# Patient Record
Sex: FEMALE | Race: BLACK OR AFRICAN AMERICAN | NOT HISPANIC OR LATINO | Employment: FULL TIME | URBAN - METROPOLITAN AREA
[De-identification: names, ages, dates, MRNs, and addresses within clinical notes are randomized per-mention and may not be internally consistent; named-entity substitution may affect disease eponyms.]

---

## 2024-10-12 ENCOUNTER — HOSPITAL ENCOUNTER (EMERGENCY)
Facility: HOSPITAL | Age: 39
Discharge: HOME | End: 2024-10-12
Attending: STUDENT IN AN ORGANIZED HEALTH CARE EDUCATION/TRAINING PROGRAM
Payer: COMMERCIAL

## 2024-10-12 VITALS
DIASTOLIC BLOOD PRESSURE: 81 MMHG | BODY MASS INDEX: 21.08 KG/M2 | WEIGHT: 131.19 LBS | TEMPERATURE: 99 F | HEART RATE: 82 BPM | RESPIRATION RATE: 18 BRPM | SYSTOLIC BLOOD PRESSURE: 134 MMHG | OXYGEN SATURATION: 100 % | HEIGHT: 66 IN

## 2024-10-12 DIAGNOSIS — B34.9 VIRAL SYNDROME: Primary | ICD-10-CM

## 2024-10-12 LAB
B-HCG UR QL: NEGATIVE
GROUP A STREP, MOLECULAR: NEGATIVE
INFLUENZA A, MOLECULAR: NEGATIVE
INFLUENZA B, MOLECULAR: NEGATIVE
SARS-COV-2 RDRP RESP QL NAA+PROBE: NEGATIVE
SPECIMEN SOURCE: NORMAL

## 2024-10-12 PROCEDURE — 25000003 PHARM REV CODE 250: Performed by: STUDENT IN AN ORGANIZED HEALTH CARE EDUCATION/TRAINING PROGRAM

## 2024-10-12 PROCEDURE — 87651 STREP A DNA AMP PROBE: CPT | Performed by: PHYSICIAN ASSISTANT

## 2024-10-12 PROCEDURE — U0002 COVID-19 LAB TEST NON-CDC: HCPCS | Performed by: STUDENT IN AN ORGANIZED HEALTH CARE EDUCATION/TRAINING PROGRAM

## 2024-10-12 PROCEDURE — 81025 URINE PREGNANCY TEST: CPT | Performed by: PHYSICIAN ASSISTANT

## 2024-10-12 PROCEDURE — 87502 INFLUENZA DNA AMP PROBE: CPT | Performed by: STUDENT IN AN ORGANIZED HEALTH CARE EDUCATION/TRAINING PROGRAM

## 2024-10-12 PROCEDURE — 25000003 PHARM REV CODE 250: Performed by: PHYSICIAN ASSISTANT

## 2024-10-12 PROCEDURE — 99283 EMERGENCY DEPT VISIT LOW MDM: CPT | Mod: 25

## 2024-10-12 RX ORDER — ACETAMINOPHEN 500 MG
1000 TABLET ORAL
Status: COMPLETED | OUTPATIENT
Start: 2024-10-12 | End: 2024-10-12

## 2024-10-12 RX ORDER — BENZONATATE 100 MG/1
100 CAPSULE ORAL 3 TIMES DAILY PRN
Qty: 15 CAPSULE | Refills: 0 | Status: SHIPPED | OUTPATIENT
Start: 2024-10-12 | End: 2024-10-17

## 2024-10-12 RX ORDER — IBUPROFEN 600 MG/1
600 TABLET ORAL
Status: COMPLETED | OUTPATIENT
Start: 2024-10-12 | End: 2024-10-12

## 2024-10-12 RX ADMIN — ACETAMINOPHEN 1000 MG: 500 TABLET ORAL at 03:10

## 2024-10-12 RX ADMIN — IBUPROFEN 600 MG: 600 TABLET ORAL at 03:10

## 2024-10-12 NOTE — DISCHARGE INSTRUCTIONS
Please return to the Emergency Department immediately for any new or concerning symptoms or if they get worse.   If you were prescribed antibiotics, please take them to completion.   If you were prescribed a narcotic medication, do not drive or operate heavy equipment or machinery, while taking these medications.  Please follow up with your primary care doctor or specialist in one week.  If you smoke, please stop smoking.    If you do not have a doctor, you can go to the website [address] [place], [place] [email or web address] look up where there is a primary care clinic close to you.    Our goal in the emergency department is to always give you outstanding care and exceptional service. You may receive a survey by mail or e-mail in the next week regarding your experience in our ED. We would greatly appreciate your completing and returning the survey. Your feedback provides us with a way to recognize our staff who give very good care and it helps us learn how to improve when your experience was below our aspiration of excellence.

## 2024-10-12 NOTE — ED PROVIDER NOTES
Encounter Date: 10/12/2024       History     Chief Complaint   Patient presents with    General Illness     Patient is a 38 year old female with no significant PMHx. She presents to the ED for general illness. She reports having generalized body aches since yesterday. Reports associated headache and cough. She denies fever,chills, nausea, vomiting, sob, chest pain, abd pain, dysuria, diarrhea, or constipation. She is a non smoker and denies alcohol use.    The history is provided by the patient and medical records. No  was used.     Review of patient's allergies indicates:  No Known Allergies  History reviewed. No pertinent past medical history.  History reviewed. No pertinent surgical history.  No family history on file.  Social History     Tobacco Use    Smoking status: Unknown     Review of Systems   Constitutional:  Negative for fever.   HENT:  Negative for sore throat.    Respiratory:  Positive for cough. Negative for shortness of breath.    Cardiovascular:  Negative for chest pain.   Gastrointestinal:  Negative for abdominal pain, nausea and vomiting.   Genitourinary:  Negative for dysuria.   Musculoskeletal:  Positive for myalgias. Negative for back pain.   Skin:  Negative for rash.   Neurological:  Positive for headaches. Negative for weakness.   Hematological:  Does not bruise/bleed easily.       Physical Exam     Initial Vitals [10/12/24 1457]   BP Pulse Resp Temp SpO2   (!) 151/82 (!) 113 20 (!) 100.8 °F (38.2 °C) 99 %      MAP       --         Physical Exam    Vitals reviewed.  Constitutional: She appears well-developed and well-nourished. No distress.   HENT:   Head: Normocephalic. Mouth/Throat: Uvula is midline and oropharynx is clear and moist. No trismus in the jaw. No uvula swelling. No oropharyngeal exudate, posterior oropharyngeal edema or posterior oropharyngeal erythema.   Patient tolerating oral secretions without difficulty.    Eyes: Conjunctivae are normal.   Neck:    Normal range of motion.  Cardiovascular:  Normal rate and regular rhythm.           No murmur heard.  Pulmonary/Chest: Breath sounds normal. No respiratory distress. She has no wheezes. She has no rales.   Musculoskeletal:         General: Normal range of motion.      Cervical back: Normal range of motion.     Neurological: She is alert and oriented to person, place, and time. She has normal strength. Gait normal.   Skin: Skin is warm and dry.         ED Course   Procedures  Labs Reviewed   INFLUENZA A & B BY MOLECULAR       Result Value    Influenza A, Molecular Negative      Influenza B, Molecular Negative      Flu A & B Source Nasal swab     GROUP A STREP, MOLECULAR    Group A Strep, Molecular Negative     THROAT SCREEN, RAPID STREP   SARS-COV-2 RNA AMPLIFICATION, QUAL    SARS-CoV-2 RNA, Amplification, Qual Negative     PREGNANCY TEST, URINE RAPID    Preg Test, Ur Negative      Narrative:     Specimen Source->Urine          Imaging Results              X-Ray Chest PA And Lateral (Final result)  Result time 10/12/24 16:13:06      Final result by Xenia Harris MD (10/12/24 16:13:06)                   Impression:      No acute abnormality.      Electronically signed by: Xenia Harris  Date:    10/12/2024  Time:    16:13               Narrative:    EXAMINATION:  XR CHEST PA AND LATERAL    CLINICAL HISTORY:  Cough, unspecified    TECHNIQUE:  PA and lateral views of the chest were performed.    COMPARISON:  None    FINDINGS:  The lungs are clear, with normal appearance of pulmonary vasculature and no pleural effusion or pneumothorax.    The cardiac silhouette is normal in size. The hilar and mediastinal contours are unremarkable.    Bones are intact.                                       Medications   acetaminophen tablet 1,000 mg (1,000 mg Oral Given 10/12/24 1501)   ibuprofen tablet 600 mg (600 mg Oral Given 10/12/24 1557)     Medical Decision Making  Problems Addressed:  Viral syndrome: acute illness  or injury    Amount and/or Complexity of Data Reviewed  Labs: ordered.  Radiology: ordered.    Risk  OTC drugs.  Prescription drug management.         APC / Resident Notes:   Patient is a 38 year old female who presents to the ED for emergent evaluation of general illness.     Will order labs. Will order antipyretic for fever of 100.8. UPT negative. Will continue to monitor.     Differential diagnoses include, but are not limited to: COVID, FLU, pharyngitis, or bronchitis    Patient is COVID, FLU, and STREP negative, hemodynamically stable, without increased work of breathing. CXR found to have no acute abn.     I strongly suspect that this patient's clinical picture is consistent with a viral infection.  At this point in time, there are no signs of a serious bacterial infection.  For this reason, it would not be safe or appropriate for this patient to be prescribed antibiotics as the potential harms (rash, diarrhea, allergic reaction) would greatly outweigh any potential benefits.    Patient reassessed, reports symptoms improved with ED management. I have discussed emergency department findings, and plan with the patient. Will discharge home with tessalon perles for symptomatic relief. Will discharge home with F/U with PCP in approximately one week. Additional verbal discharge instructions were given and discussed with the patient. Patient verbalizes understanding of plan and agrees. Return precautions given.                     Clinical Impression:  Final diagnoses:  [B34.9] Viral syndrome (Primary)          ED Disposition Condition    Discharge Stable          ED Prescriptions       Medication Sig Dispense Start Date End Date Auth. Provider    benzonatate (TESSALON) 100 MG capsule Take 1 capsule (100 mg total) by mouth 3 (three) times daily as needed for Cough. 15 capsule 10/12/2024 10/17/2024 Clarisa Mazariegos PA-C          Follow-up Information       Follow up With Specialties Details Why Contact Info     [facility]  Schedule an appointment as soon as possible for a visit in 1 week  [address]  [facility] [address]             Clarisa Mazariegos PA-C  10/12/24 [id number]

## 2024-10-12 NOTE — ED TRIAGE NOTES
38 y.o. female presents to ER Room/bed info not found   Chief Complaint   Patient presents with    General Illness   .   C/o headache, dry cough, back aches since yesterday

## 2024-11-22 ENCOUNTER — PATIENT MESSAGE (OUTPATIENT)
Dept: RESEARCH | Facility: HOSPITAL | Age: 39
End: 2024-11-22
Payer: COMMERCIAL

## 2025-01-09 ENCOUNTER — HOSPITAL ENCOUNTER (EMERGENCY)
Facility: HOSPITAL | Age: 40
Discharge: HOME | End: 2025-01-10
Attending: SURGERY
Payer: COMMERCIAL

## 2025-01-09 DIAGNOSIS — R51.9 NONINTRACTABLE HEADACHE, UNSPECIFIED CHRONICITY PATTERN, UNSPECIFIED HEADACHE TYPE: Primary | ICD-10-CM

## 2025-01-09 DIAGNOSIS — I10 HTN (HYPERTENSION): ICD-10-CM

## 2025-01-09 LAB
ALBUMIN SERPL BCP-MCNC: 4.1 G/DL (ref 3.5–5.2)
ALP SERPL-CCNC: 146 U/L (ref 40–150)
ALT SERPL W/O P-5'-P-CCNC: 12 U/L (ref 10–44)
ANION GAP SERPL CALC-SCNC: 9 MMOL/L (ref 8–16)
AST SERPL-CCNC: 19 U/L (ref 10–40)
B-HCG UR QL: NEGATIVE
BASOPHILS # BLD AUTO: 0.02 K/UL (ref 0–0.2)
BASOPHILS NFR BLD: 0.4 % (ref 0–1.9)
BILIRUB SERPL-MCNC: 0.3 MG/DL (ref 0.1–1)
BILIRUB UR QL STRIP: NEGATIVE
BNP SERPL-MCNC: <10 PG/ML (ref 0–99)
BUN SERPL-MCNC: 8 MG/DL (ref 6–20)
CALCIUM SERPL-MCNC: 9.2 MG/DL (ref 8.7–10.5)
CHLORIDE SERPL-SCNC: 105 MMOL/L (ref 95–110)
CLARITY UR: CLEAR
CO2 SERPL-SCNC: 24 MMOL/L (ref 23–29)
COLOR UR: YELLOW
CREAT SERPL-MCNC: 0.7 MG/DL (ref 0.5–1.4)
DIFFERENTIAL METHOD BLD: ABNORMAL
EOSINOPHIL # BLD AUTO: 0.1 K/UL (ref 0–0.5)
EOSINOPHIL NFR BLD: 2 % (ref 0–8)
ERYTHROCYTE [DISTWIDTH] IN BLOOD BY AUTOMATED COUNT: 13.4 % (ref 11.5–14.5)
EST. GFR  (NO RACE VARIABLE): >60 ML/MIN/1.73 M^2
GLUCOSE SERPL-MCNC: 82 MG/DL (ref 70–110)
GLUCOSE UR QL STRIP: NEGATIVE
HCT VFR BLD AUTO: 36.6 % (ref 37–48.5)
HGB BLD-MCNC: 12.2 G/DL (ref 12–16)
HGB UR QL STRIP: ABNORMAL
IMM GRANULOCYTES # BLD AUTO: 0.01 K/UL (ref 0–0.04)
IMM GRANULOCYTES NFR BLD AUTO: 0.2 % (ref 0–0.5)
KETONES UR QL STRIP: NEGATIVE
LEUKOCYTE ESTERASE UR QL STRIP: NEGATIVE
LYMPHOCYTES # BLD AUTO: 1.9 K/UL (ref 1–4.8)
LYMPHOCYTES NFR BLD: 33.5 % (ref 18–48)
MCH RBC QN AUTO: 29.4 PG (ref 27–31)
MCHC RBC AUTO-ENTMCNC: 33.3 G/DL (ref 32–36)
MCV RBC AUTO: 88 FL (ref 82–98)
MONOCYTES # BLD AUTO: 0.5 K/UL (ref 0.3–1)
MONOCYTES NFR BLD: 8.5 % (ref 4–15)
NEUTROPHILS # BLD AUTO: 3.1 K/UL (ref 1.8–7.7)
NEUTROPHILS NFR BLD: 55.4 % (ref 38–73)
NITRITE UR QL STRIP: NEGATIVE
NRBC BLD-RTO: 0 /100 WBC
PH UR STRIP: 7 [PH] (ref 5–8)
PLATELET # BLD AUTO: 289 K/UL (ref 150–450)
PMV BLD AUTO: 9.8 FL (ref 9.2–12.9)
POTASSIUM SERPL-SCNC: 3.6 MMOL/L (ref 3.5–5.1)
PROT SERPL-MCNC: 8.6 G/DL (ref 6–8.4)
PROT UR QL STRIP: NEGATIVE
RBC # BLD AUTO: 4.15 M/UL (ref 4–5.4)
SODIUM SERPL-SCNC: 138 MMOL/L (ref 136–145)
SP GR UR STRIP: 1.01 (ref 1–1.03)
TROPONIN I SERPL DL<=0.01 NG/ML-MCNC: <0.006 NG/ML (ref 0–0.03)
URN SPEC COLLECT METH UR: ABNORMAL
UROBILINOGEN UR STRIP-ACNC: NEGATIVE EU/DL
WBC # BLD AUTO: 5.52 K/UL (ref 3.9–12.7)

## 2025-01-09 PROCEDURE — 99285 EMERGENCY DEPT VISIT HI MDM: CPT | Mod: 25

## 2025-01-09 PROCEDURE — 85025 COMPLETE CBC W/AUTO DIFF WBC: CPT | Performed by: NURSE PRACTITIONER

## 2025-01-09 PROCEDURE — 80053 COMPREHEN METABOLIC PANEL: CPT | Performed by: NURSE PRACTITIONER

## 2025-01-09 PROCEDURE — 84484 ASSAY OF TROPONIN QUANT: CPT | Performed by: NURSE PRACTITIONER

## 2025-01-09 PROCEDURE — 81025 URINE PREGNANCY TEST: CPT | Performed by: NURSE PRACTITIONER

## 2025-01-09 PROCEDURE — 25000003 PHARM REV CODE 250: Performed by: NURSE PRACTITIONER

## 2025-01-09 PROCEDURE — 81003 URINALYSIS AUTO W/O SCOPE: CPT | Performed by: NURSE PRACTITIONER

## 2025-01-09 PROCEDURE — 83880 ASSAY OF NATRIURETIC PEPTIDE: CPT | Performed by: NURSE PRACTITIONER

## 2025-01-09 RX ORDER — CLONIDINE HYDROCHLORIDE 0.1 MG/1
0.2 TABLET ORAL
Status: COMPLETED | OUTPATIENT
Start: 2025-01-09 | End: 2025-01-09

## 2025-01-09 RX ORDER — ASPIRIN 325 MG
325 TABLET ORAL
Status: COMPLETED | OUTPATIENT
Start: 2025-01-10 | End: 2025-01-10

## 2025-01-09 RX ADMIN — CLONIDINE HYDROCHLORIDE 0.2 MG: 0.1 TABLET ORAL at 08:01

## 2025-01-09 NOTE — Clinical Note
"Ellis Nelson (Shawntika) was seen and treated in our emergency department on 1/9/2025.  She may return to work on 01/13/2025.       If you have any questions or concerns, please don't hesitate to call.      Kell PHELPS    "

## 2025-01-10 VITALS
OXYGEN SATURATION: 100 % | WEIGHT: 154 LBS | SYSTOLIC BLOOD PRESSURE: 146 MMHG | DIASTOLIC BLOOD PRESSURE: 83 MMHG | BODY MASS INDEX: 30.23 KG/M2 | HEART RATE: 73 BPM | RESPIRATION RATE: 20 BRPM | HEIGHT: 60 IN | TEMPERATURE: 98 F

## 2025-01-10 PROCEDURE — 25500020 PHARM REV CODE 255: Performed by: SURGERY

## 2025-01-10 PROCEDURE — 25000003 PHARM REV CODE 250: Performed by: SURGERY

## 2025-01-10 RX ORDER — DICYCLOMINE HYDROCHLORIDE 20 MG/1
20 TABLET ORAL 2 TIMES DAILY
Qty: 20 TABLET | Refills: 0 | Status: SHIPPED | OUTPATIENT
Start: 2025-01-10 | End: 2025-02-09

## 2025-01-10 RX ORDER — IBUPROFEN 600 MG/1
600 TABLET ORAL
Qty: 20 TABLET | Refills: 0 | Status: SHIPPED | OUTPATIENT
Start: 2025-01-10

## 2025-01-10 RX ADMIN — IOHEXOL 75 ML: 350 INJECTION, SOLUTION INTRAVENOUS at 12:01

## 2025-01-10 RX ADMIN — ASPIRIN 325 MG ORAL TABLET 325 MG: 325 PILL ORAL at 01:01

## 2025-01-10 NOTE — DISCHARGE INSTRUCTIONS
Thank you for coming to our Emergency Department today!     -return to the emergency department for severe headaches, weakness, trouble walking, visual problems, passing out  -Follow-up with primary care doctor within 3-7 days to discuss your recent ER visit and any additional concerns that you may have.    Return to the Emergency Department for symptoms including but not limited to: persistence or worsening of symptoms, shortness of breath or chest pain, inability to drink without vomiting, passing out/fainting/ loss of consciousness, or if you have other concerns.

## 2025-01-10 NOTE — PROVIDER PROGRESS NOTES - EMERGENCY DEPT.
ER Physician Progress/Interval Note     Patient presented tonight to the nurse practitioner with a headache  Patient also had high blood pressure with no history of HTN noted  CT of the head showed a possible dense MCA per the report  Could be calcification, could be a thrombosis on ER report review    CTA head neck showed no acute findings this evening  Patient's blood pressure is better, rest of workup (-)  We will order an MRI in the morning to assess the MCA  Completely normal neuro exam on my assessment now  Will transition this patient to the morning physician at 6:00 a.m.  MRI of the brain to rule out any thrombosis, aspirin given    Critical Care ED Physician Time (minutes):  -- Performed by: Jared Maria M.D.  -- Date/Time: 2:53 AM 1/10/2025   -- Direct Patient Care (Face Time): 15  -- Additional History from Records or Taking Additional History: 15  -- Ordering, Reviewing, and Interpreting Diagnostic Studies: 15  -- Total Time in Documentation: 5  -- Consultation with Other Physicians: 0  -- Consultation with Family Related to Condition: 0  -- Total Critical Care Time: 50  -- Critical care was necessary to treat Possible CVA  -- Critical care was time spent personally by me on the following activities:   -- development of treatment plan with patient & their family  -- examination of patient, ordering and performing treatments   -- review of radiographic studies, re-evaluation of pt's condition  -- review of labs and evaluation of response to treatment         Jared Maria M.D. 2:51 AM 1/10/2025

## 2025-01-10 NOTE — ED PROVIDER NOTES
Encounter Date: 1/9/2025       History     Chief Complaint   Patient presents with    Headache     Patient reports having a headache, feeling dizzy, and lightheaded while at work.  States she took her blood pressure and it was 195/113.  Patient does not take blood pressure medication.  No chest pain.  States she does have blurry vision.     Ellis Nelson is a 39 y.o. female with no significant PMH presenting to the ED for evaluation of headache.  Patient reports that she developed dizziness with a left-sided headache while at work earlier today.  She checked her blood pressure and it was 195/113 with no prior history of hypertension.  She denies visual changes, facial tingling, or numbness.  Denies chest pain or shortness of breath.    The history is provided by the patient.     Review of patient's allergies indicates:  No Known Allergies  History reviewed. No pertinent past medical history.  History reviewed. No pertinent surgical history.  No family history on file.  Social History     Tobacco Use    Smoking status: Unknown     Review of Systems   Constitutional:  Negative for activity change, chills and fever.   HENT:  Negative for congestion, ear discharge, ear pain, postnasal drip, sinus pressure, sinus pain and sore throat.    Respiratory:  Negative for cough, chest tightness and shortness of breath.    Cardiovascular:  Negative for chest pain.   Gastrointestinal:  Negative for abdominal distention, abdominal pain and nausea.   Genitourinary:  Negative for dysuria, frequency and urgency.   Musculoskeletal:  Negative for back pain.   Skin: Negative.  Negative for rash.   Neurological:  Positive for headaches. Negative for dizziness, weakness, light-headedness and numbness.   Hematological:  Does not bruise/bleed easily.       Physical Exam     Initial Vitals [01/09/25 2032]   BP Pulse Resp Temp SpO2   (!) 195/88 84 18 98.4 °F (36.9 °C) 100 %      MAP       --         Physical Exam    Nursing note and vitals  reviewed.  Constitutional: She appears well-developed and well-nourished.   HENT:   Head: Normocephalic and atraumatic.   Eyes: Conjunctivae and EOM are normal. Pupils are equal, round, and reactive to light.   Neck: Neck supple.   Cardiovascular:  Normal rate, regular rhythm, normal heart sounds and intact distal pulses.           Pulmonary/Chest: Breath sounds normal.   Abdominal: Abdomen is soft. Bowel sounds are normal.   Musculoskeletal:         General: Normal range of motion.      Cervical back: Neck supple.     Neurological: She is alert and oriented to person, place, and time. She has normal strength. No cranial nerve deficit or sensory deficit. GCS eye subscore is 4. GCS verbal subscore is 5. GCS motor subscore is 6.   Alert. Though processes coherent. AAO X 3. CN's intact. Good muscle bulk and tone. Strength 5/5 throughout. Cerebellar: F-N intact, H-S intact. Gait normal. Romberg: maintains balance with eyes closed. No pronator drift.     Skin: Skin is warm and dry.   Psychiatric: She has a normal mood and affect. Her behavior is normal. Judgment and thought content normal.         ED Course   Procedures  Labs Reviewed   CBC W/ AUTO DIFFERENTIAL - Abnormal       Result Value    WBC 5.52      RBC 4.15      Hemoglobin 12.2      Hematocrit 36.6 (*)     MCV 88      MCH 29.4      MCHC 33.3      RDW 13.4      Platelets 289      MPV 9.8      Immature Granulocytes 0.2      Gran # (ANC) 3.1      Immature Grans (Abs) 0.01      Lymph # 1.9      Mono # 0.5      Eos # 0.1      Baso # 0.02      nRBC 0      Gran % 55.4      Lymph % 33.5      Mono % 8.5      Eosinophil % 2.0      Basophil % 0.4      Differential Method Automated     COMPREHENSIVE METABOLIC PANEL - Abnormal    Sodium 138      Potassium 3.6      Chloride 105      CO2 24      Glucose 82      BUN 8      Creatinine 0.7      Calcium 9.2      Total Protein 8.6 (*)     Albumin 4.1      Total Bilirubin 0.3      Alkaline Phosphatase 146      AST 19      ALT 12       eGFR >60      Anion Gap 9     URINALYSIS, REFLEX TO URINE CULTURE - Abnormal    Specimen UA Urine, Clean Catch      Color, UA Yellow      Appearance, UA Clear      pH, UA 7.0      Specific Gravity, UA 1.015      Protein, UA Negative      Glucose, UA Negative      Ketones, UA Negative      Bilirubin (UA) Negative      Occult Blood UA Trace (*)     Nitrite, UA Negative      Urobilinogen, UA Negative      Leukocytes, UA Negative      Narrative:     Specimen Source->Urine   TROPONIN I    Troponin I <0.006     B-TYPE NATRIURETIC PEPTIDE    BNP <10     PREGNANCY TEST, URINE RAPID    Preg Test, Ur Negative      Narrative:     Specimen Source->Urine          Imaging Results              MRI Brain Without Contrast (Final result)  Result time 01/10/25 09:40:49      Final result by Nay Howe MD (01/10/25 09:40:49)                   Impression:      No acute abnormality      Electronically signed by: Nay Howe MD  Date:    01/10/2025  Time:    09:40               Narrative:    EXAMINATION:  MRI BRAIN WITHOUT CONTRAST    CLINICAL HISTORY:  Headache, chronic, new features or increased frequency;.    TECHNIQUE:  Multiplanar multisequence MR imaging of the brain was performed without contrast.    COMPARISON:  None    FINDINGS:  Intracranial compartment:    Ventricles and sulci are normal in size for age without evidence of hydrocephalus. No extra-axial blood or fluid collections.    The brain parenchyma appears normal. No mass lesion, acute hemorrhage, edema or acute infarct.    Normal vascular flow voids are preserved.    Skull/extracranial contents (limited evaluation): Bone marrow signal intensity is normal.                                       CTA Head and Neck (xpd) (Final result)  Result time 01/10/25 01:27:36      Final result by Loi Romero MD (01/10/25 01:27:36)                   Impression:      The major intracranial and extracranial arterial vascular structures demonstrate no evidence for  high-grade stenosis or occlusion, there is no evidence for MCA or MCA branch vessel thrombus or occlusion on this exam.    There is no evidence for acute intracranial process.    The thyroid appears enlarged with multiple thyroid nodules noted for which clinical and historical correlation is needed to determine need for additional follow-up and evaluation.      Electronically signed by: Loi Romero  Date:    01/10/2025  Time:    01:27               Narrative:    EXAMINATION:  CTA HEAD AND NECK (XPD)    CLINICAL HISTORY:  Stroke/TIA, determine embolic source;    TECHNIQUE:  CT angiogram was performed from the level of the cade to the top of the head following the IV administration of 75mL of Omnipaque 350.  MIP reconstruction imaging was performed.  Sagittal and coronal reconstruction imaging as well as MIP reconstruction imaging was performed by the radiologist at the time of dictation on the radiologist workstation and reviewed by the radiologist at that time.  Arterial stenosis percentages are based on NASCET measurement criteria.    COMPARISON:  CT examination of the brain without contrast January 9, 2025    FINDINGS:  The ventricular system, sulcal pattern and parenchymal attenuation characteristics appears stable when compared to the earlier CT examination, there is no evidence for intracranial mass, mass effect or midline shift and there is no evidence for acute intracranial hemorrhage.  Appropriate CSF spaces are seen at the skull base.  There is no evidence for hydrocephalus.    The visualized aspects of the thoracic aortic arch appear appropriate, demonstrating appropriate opacification.  The vertebral arteries bilaterally demonstrate appropriate opacification from the level of their origin to the level of the basilar artery without evidence for high-grade stenosis or occlusion.  The basilar artery demonstrates appropriate opacification.    The common, internal and external carotid arteries bilaterally  demonstrate appropriate opacification.  The internal carotid arteries demonstrate appropriate opacification to the level of the skull base and [id number], there is no evidence for high-grade stenosis or occlusion, no evidence for dissection.    The anterior cerebral artery and middle cerebral artery bilaterally demonstrate appropriate opacification.  There is no evidence for high-grade stenosis or occlusion, there is no finding referable to the right MCA branch vessels to account for the appearance on the head CT, no evidence for arterial thrombus or occlusion.  There appear to be bilateral posterior communicating arteries, and there is appearance of a hypoplastic P1 segment of the right PCA, otherwise the PCA bilaterally demonstrate appropriate opacification.    There is no evidence for intracranial aneurysm or AVM.  The intracranial venous sinuses appear appropriate.    The visualized orbits appear intact.  Mild paranasal sinus mucosal thickening is noted.  The mastoid air cells appear well aerated.  The parotid and submandibular glands appear appropriate.  The thyroid appears prominent with multiple small thyroid nodules, of variable attenuation character, the most prominent nodule measures up to approximately 9.6 mm on axial imaging.    The airway appears patent.  There is no evidence for dominant neck mass or cystic collection.  The visualized osseous structures appear intact.  Postprocedural dental change with associated artifact is noted.  Limited imaging of the lung apices appears clear.                                        CT Head Without Contrast (Final result)  Result time 01/09/25 21:13:34      Final result by Loi Romero MD (01/09/25 21:13:34)                   Impression:      Suspected dense MCA branch vessel at the sylvian fissure on the right, it is possible this relates to calcifications/mineralization however the possibility of thrombosis of an MCA branch vessel is to be  considered, clinical and historical correlation is needed, if indicated CTA and or MRI follow-up.    There is no additional evidence for acute intracranial process.    This report was flagged in [employer] as abnormal.      Electronically signed by: Loi Romero  Date:    01/09/2025  Time:    21:13               Narrative:    EXAMINATION:  CT HEAD WITHOUT CONTRAST    CLINICAL HISTORY:  Headache, chronic, new features or increased frequency;    TECHNIQUE:  Low dose axial images were obtained through the head.  Coronal and sagittal reformations were also performed. Contrast was not administered.    COMPARISON:  None.    FINDINGS:  Seen on axial images 10 and 11 there is dense appearance involving what appears to be a vascular structure at the level of the sylvian fissure on the right, and although this could relate to mild mineralization/calcification of avascular structure, this can also be seen with intraluminal thrombus, potentially involving an MCA branch vessel.  Clinical and historical correlation is needed, if indicated CTA examination can be done for further evaluation.    Otherwise the appearance of the ventricular system, sulcal pattern and parenchymal attenuation character is appropriate for age.  There is no evidence for intracranial mass, mass effect or midline shift and there is no additional evidence for acute intracranial hemorrhage.  The possibility that the aforementioned finding relates to hemorrhage is a consideration although thought less likely given its configuration.    Gray-white differentiation appears appropriate.  There is no evidence for hydrocephalus.  Appropriate CSF spaces are seen at the skull base.    The mastoid air cells appear well aerated.  Mild paranasal sinus mucosal thickening is noted.  The visualized orbits appear intact.  The visualized osseous structures appear intact.                                       X-Ray Chest AP Portable (Final result)  Result time 01/09/25 21:16:48       Final result by Loi Romero MD (01/09/25 21:16:48)                   Impression:      Mild atelectatic change without additional radiographic evidence for superimposed acute intrathoracic process.      Electronically signed by: Loi Romero  Date:    01/09/2025  Time:    21:16               Narrative:    EXAMINATION:  XR CHEST AP PORTABLE    CLINICAL HISTORY:  Essential (primary) hypertension    TECHNIQUE:  Single frontal view of the chest was performed.    COMPARISON:  Chest radiograph October 12, 2024    FINDINGS:  Single portable chest radiograph is submitted.  When accounting for position and technique and depth of inspiration the cardiomediastinal silhouette appears appropriate.    Mild accentuation of parenchymal markings at the lung bases may relate to mild atelectatic change, there is no focal consolidation, significant pleural effusion or pneumothorax.    Mild scoliotic curvature of the spine noted, the osseous structures otherwise appear intact.                                       Medications   cloNIDine tablet 0.2 mg (0.2 mg Oral Given 1/9/25 2050)   aspirin tablet 325 mg (325 mg Oral Given 1/10/25 0105)   iohexoL (OMNIPAQUE 350) injection 75 mL (75 mLs Intravenous Given 1/10/25 0015)     Medical Decision Making  Amount and/or Complexity of Data Reviewed  Labs: ordered.  Radiology: ordered.    Risk  OTC drugs.  Prescription drug management.  Risk Details: Received sign-out at 6:00 a.m. from night shift provider pending MRI.  Patient had a workup done which revealed grossly unremarkable labs.  She had a CT done for headache which showed a possible hyperdense MCA sign.  A subsequent CTA showed no large vessel occlusion.  An MRI was ordered and patient kept in the emergency department until was able to be performed.  MRI unremarkable.  Patient reports resolution of her headache.  Patient requesting medication for abdominal cramping.  No abdominal tenderness on examination and no guarding or  peritoneal signs identified.  Patient is stable for discharge at this time.  Given medications for symptomatic treatment at home.  Discussed results, diagnosis, and treatment plan with patient; advised close follow-up with PCP. Reviewed strict return precautions. Patient confirms understanding and ability to comply. Patient was given the opportunity to ask questions prior to discharge and all questions answered.                                       Clinical Impression:  Final diagnoses:  [I10] HTN (hypertension)  [R51.9] Nonintractable headache, unspecified chronicity pattern, unspecified headache type (Primary)          ED Disposition Condition    Discharge Stable          ED Prescriptions       Medication Sig Dispense Start Date End Date Auth. Provider    dicyclomine (BENTYL) 20 mg tablet Take 1 tablet (20 mg total) by mouth 2 (two) times daily. 20 tablet 1/10/2025 2/9/2025 Cristopher Hassan MD    ibuprofen (ADVIL,MOTRIN) 600 MG tablet Take 1 tablet (600 mg total) by mouth 3 (three) times daily with meals. 20 tablet 1/10/2025 -- Cristopher Hassan MD          Follow-up Information    None          Cristopher Hassan MD  01/10/25 2505

## 2025-01-10 NOTE — ED TRIAGE NOTES
39 y.o. female presents to ER ED 02/ED 02A   Chief Complaint   Patient presents with    Headache     Patient reports having a headache, feeling dizzy, and lightheaded while at work.  States she took her blood pressure and it was 195/113.  Patient does not take blood pressure medication.  No chest pain.  States she does have blurry vision.